# Patient Record
Sex: MALE | Race: WHITE | NOT HISPANIC OR LATINO | Employment: UNEMPLOYED | URBAN - NONMETROPOLITAN AREA
[De-identification: names, ages, dates, MRNs, and addresses within clinical notes are randomized per-mention and may not be internally consistent; named-entity substitution may affect disease eponyms.]

---

## 2021-05-24 ENCOUNTER — OFFICE VISIT (OUTPATIENT)
Dept: CARDIOLOGY | Facility: CLINIC | Age: 36
End: 2021-05-24

## 2021-05-24 VITALS
DIASTOLIC BLOOD PRESSURE: 72 MMHG | TEMPERATURE: 97.1 F | WEIGHT: 172 LBS | OXYGEN SATURATION: 96 % | SYSTOLIC BLOOD PRESSURE: 110 MMHG | HEART RATE: 85 BPM | RESPIRATION RATE: 12 BRPM | BODY MASS INDEX: 23.3 KG/M2 | HEIGHT: 72 IN

## 2021-05-24 DIAGNOSIS — R55 SYNCOPE, UNSPECIFIED SYNCOPE TYPE: Primary | ICD-10-CM

## 2021-05-24 DIAGNOSIS — R00.2 PALPITATIONS: ICD-10-CM

## 2021-05-24 PROCEDURE — 93000 ELECTROCARDIOGRAM COMPLETE: CPT | Performed by: PHYSICIAN ASSISTANT

## 2021-05-24 PROCEDURE — 99204 OFFICE O/P NEW MOD 45 MIN: CPT | Performed by: PHYSICIAN ASSISTANT

## 2021-05-24 NOTE — PROGRESS NOTES
"[facility]  [facility] CARDIOLOGY  [address] [place] [address]  Dept: [phone]  Dept Fax: [phone]    Yaya Preston  1985    New Patient Office Note    History of Present Illness:  Patient is a 35-year-old male today for primary care provider referral for syncopal episode.  Patient states that he moved here from [place] approximately 6 to 8 months ago.  Patient states just prior to moving here he had an episode of passing out.  Patient was taken to the emergency room and underwent evaluation and was told that he had \"a mild heart attack\".  Patient denied any other cardiac symptoms at that time other than the syncopal episode.  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  Patient was told that his heroin use is what caused the heart attack.  Patient has been sober now for approximately 8 months.  Patient denies any cardiac symptoms today or recently or since he was in the emergency room such as chest pain, shortness of breath, syncope, or near syncopal episodes.  Patient does have some palpitations from time to time but states this is more related to anxiety when he is around large crowds. Patient has been prescribed propranolol 80 mg daily for anxiety but states that he does not take this medication.  Patient denies any other cardiac history.      The following portions of the patient's history were reviewed and updated as appropriate: allergies, current medications, past family history, past medical history, past social history, past surgical history and problem list.    Medications:  MUPIROCIN CALCIUM 2% NASAL OINTMENT 1 G SYRINGE CMPD ([facility])  propranolol  triamcinolone    Subjective  Allergies   Allergen Reactions   • Penicillins Hives and Rash        Past Medical History:   Diagnosis Date   • Anxiety    • Backache    • Elevated blood pressure reading    • Heart failure (CMS/HCC)    • Hepatitis    • Neurotic depression    • Skin " "disorder        History reviewed. No pertinent surgical history.    Family History   Problem Relation Age of Onset   • Depression Mother    • Cancer Mother    • Hypertension Mother    • Mental illness Mother    • Alcohol abuse Other         Social History     Socioeconomic History   • Marital status: Single     Spouse name: Not on file   • Number of children: Not on file   • Years of education: Not on file   • Highest education level: Not on file   Tobacco Use   • Smoking status: Current Some Day Smoker     Packs/day: 0.50   • Smokeless tobacco: Never Used   Substance and Sexual Activity   • Alcohol use: Yes   • Drug use: Not Currently   • Sexual activity: Defer       Review of Systems   Constitutional: Negative for activity change, chills and fever.   Respiratory: Negative for cough and shortness of breath.    Cardiovascular: Positive for palpitations. Negative for chest pain and leg swelling.   Gastrointestinal: Negative for diarrhea, nausea and vomiting.   Endocrine: Negative for cold intolerance and heat intolerance.   Musculoskeletal: Negative for arthralgias and myalgias.   Skin: Negative for rash and wound.   Neurological: Negative for dizziness, light-headedness and headaches.       Objective  Vitals:    05/24/21 1306   BP: 110/72   BP Location: Left arm   Patient Position: Sitting   Cuff Size: Adult   Pulse: 85   Resp: 12   Temp: 97.1 °F (36.2 °C)   TempSrc: Infrared   SpO2: 96%   Weight: 78 kg (172 lb)   Height: 182.9 cm (72\")   PainSc: 0-No pain       Physical Exam  Vitals reviewed.   Constitutional:       Appearance: Healthy appearance. Not in distress.   Neck:      Vascular: No JVR. JVD normal.   Pulmonary:      Effort: Pulmonary effort is normal.      Breath sounds: Normal breath sounds. No wheezing. No rhonchi. No rales.   Chest:      Chest wall: Not tender to palpatation.   Cardiovascular:      PMI at left midclavicular line. Normal rate. Regular rhythm. Normal S1. Normal S2.      Murmurs: There is " no murmur.      No gallop. No click. No rub.   Pulses:     Intact distal pulses.   Edema:     Peripheral edema absent.   Abdominal:      General: Bowel sounds are normal.      Palpations: Abdomen is soft.      Tenderness: There is no abdominal tenderness.   Musculoskeletal: Normal range of motion.         General: No tenderness. Skin:     General: Skin is warm and dry.   Neurological:      General: No focal deficit present.      Mental Status: Alert and oriented to person, place and time.          Diagnostic Data    ECG 12 Lead    Date/Time: 5/24/2021 1:44 PM  Performed by: Andrew Gonzalez PA-C  Authorized by: Andrew Gonzalez PA-C   Comparison: not compared with previous ECG   Rhythm: sinus rhythm  Rate: normal            Assessment  Diagnoses and all orders for this visit:    1. Syncope, unspecified syncope type (Primary)  -     Adult Transthoracic Echo Complete W/ Cont if Necessary Per Protocol; Future  -     Comprehensive Metabolic Panel  -     CK  -     CBC & Differential    2. Palpitations  -     Holter Monitor - 48 Hour; Future        Plan    1. Syncope, unspecified syncope type (Primary)- resolved, will get records from [facility]. Obtain echo, ck, cmp, cbc, and lipids.    2. Palpitations- obtain 48 hour holter.      Return in about 4 weeks (around 6/21/2021) for echo and holter results.    Andrew Gonzalez PA-C  05/24/2021

## 2021-06-10 ENCOUNTER — TELEPHONE (OUTPATIENT)
Dept: CARDIOLOGY | Facility: CLINIC | Age: 36
End: 2021-06-10